# Patient Record
Sex: MALE | Race: WHITE | Employment: FULL TIME | URBAN - METROPOLITAN AREA
[De-identification: names, ages, dates, MRNs, and addresses within clinical notes are randomized per-mention and may not be internally consistent; named-entity substitution may affect disease eponyms.]

---

## 2019-07-01 ENCOUNTER — OFFICE VISIT (OUTPATIENT)
Dept: URGENT CARE | Facility: URGENT CARE | Age: 34
End: 2019-07-01
Payer: COMMERCIAL

## 2019-07-01 VITALS
DIASTOLIC BLOOD PRESSURE: 78 MMHG | OXYGEN SATURATION: 98 % | WEIGHT: 186 LBS | SYSTOLIC BLOOD PRESSURE: 131 MMHG | TEMPERATURE: 96.5 F | HEART RATE: 60 BPM

## 2019-07-01 DIAGNOSIS — N50.812 LEFT TESTICULAR PAIN: Primary | ICD-10-CM

## 2019-07-01 LAB
ALBUMIN UR-MCNC: NEGATIVE MG/DL
APPEARANCE UR: CLEAR
BILIRUB UR QL STRIP: NEGATIVE
COLOR UR AUTO: YELLOW
GLUCOSE UR STRIP-MCNC: NEGATIVE MG/DL
HGB UR QL STRIP: NEGATIVE
KETONES UR STRIP-MCNC: NEGATIVE MG/DL
LEUKOCYTE ESTERASE UR QL STRIP: NEGATIVE
NITRATE UR QL: NEGATIVE
PH UR STRIP: 5.5 PH (ref 5–7)
SOURCE: NORMAL
SP GR UR STRIP: >1.03 (ref 1–1.03)
UROBILINOGEN UR STRIP-ACNC: 0.2 EU/DL (ref 0.2–1)

## 2019-07-01 PROCEDURE — 99203 OFFICE O/P NEW LOW 30 MIN: CPT | Performed by: PHYSICIAN ASSISTANT

## 2019-07-01 PROCEDURE — 87491 CHLMYD TRACH DNA AMP PROBE: CPT | Performed by: PHYSICIAN ASSISTANT

## 2019-07-01 PROCEDURE — 87591 N.GONORRHOEAE DNA AMP PROB: CPT | Performed by: PHYSICIAN ASSISTANT

## 2019-07-01 PROCEDURE — 81003 URINALYSIS AUTO W/O SCOPE: CPT | Performed by: PHYSICIAN ASSISTANT

## 2019-07-01 SDOH — HEALTH STABILITY: MENTAL HEALTH: HOW OFTEN DO YOU HAVE A DRINK CONTAINING ALCOHOL?: 2-4 TIMES A MONTH

## 2019-07-01 ASSESSMENT — ENCOUNTER SYMPTOMS
BLURRED VISION: 0
FREQUENCY: 0
HEADACHES: 0
NAUSEA: 0
PSYCHIATRIC NEGATIVE: 1
CONSTIPATION: 0
DIARRHEA: 0
FLANK PAIN: 1
ABDOMINAL PAIN: 0
DYSURIA: 0
BACK PAIN: 1
SHORTNESS OF BREATH: 0
HEMATURIA: 0
SORE THROAT: 0
DIZZINESS: 0
BRUISES/BLEEDS EASILY: 0
VOMITING: 0
FEVER: 0

## 2019-07-01 NOTE — PATIENT INSTRUCTIONS
Your urine showed no signs of a urinary tract infection (this can sometimes cause testicular pain).    Your gonorrhea and chlamydia testing will return in the next few days. We will call you only if results are positive, otherwise, no call means negative. You may also sign up for [employer] at [email or web address] to view your own results.    Please get your testicular ultrasound done in the next few days. Call to schedule your appointment.    If severe pain, testicular swelling or redness, fever, or any other new, concerning symptoms, go to the ER immediately.

## 2019-07-01 NOTE — PROGRESS NOTES
"SUBJECTIVE:   Sandor Gillis is a 34 year old male presenting for evaluation of   Chief Complaint   Patient presents with     Urgent Care     Pt in clinic to have eval for groin pain.     Groin Pain   .  This weekend he woke up with left testicular \"soreness.\" It went away a little bit but then flared again after he went lifting this morning. No redness or discoloration of testicle, he says.  He says \"it feels like he got hit in the nuts.\"  Says the pain level is \"really low and if it weren't his testicles he would just let it go.\"  He admits to mild left flank to abdominal pain. This comes and goes. Feels like \"the stomachache when you get hit in the groin.\" Notices it more when he exerts himself.   He travels a lot. Denies known exposure to STIs but consents to testing today.  No dysuria, hematuria, discharge from penis. No fever. No nausea, vomiting. Has had normal BMs. No hernia.  He has not tried any treatments.    Review of Systems   Constitutional: Negative for fever.   HENT: Negative for sore throat.    Eyes: Negative for blurred vision.   Respiratory: Negative for shortness of breath.    Cardiovascular: Negative for chest pain.   Gastrointestinal: Negative for abdominal pain, constipation, diarrhea, nausea and vomiting.   Genitourinary: Positive for flank pain. Negative for dysuria, frequency and hematuria.        Left testicular pain   Musculoskeletal: Positive for back pain.   Skin: Negative for rash.   Neurological: Negative for dizziness and headaches.   Endo/Heme/Allergies: Does not bruise/bleed easily.   Psychiatric/Behavioral: Negative.          PMH:  Past Medical History:   Diagnosis Date     NO ACTIVE PROBLEMS      There are no active problems to display for this patient.        Current medications:  No current outpatient medications on file.       Surgical History:  Past Surgical History:   Procedure Laterality Date     no prior surgeries         Family history:  Family History   Problem Relation " Age of Onset     Parkinsonism Mother      No Known Problems Father          Social History:  Social History     Tobacco Use     Smoking status: Never Smoker     Smokeless tobacco: Never Used   Substance Use Topics     Alcohol use: Yes     Frequency: 2-4 times a month           OBJECTIVE:  /78   Pulse 60   Temp 96.5  F (35.8  C) (Tympanic)   Wt 84.4 kg (186 lb)   SpO2 98%     Physical Exam  General: alert, appears well, NAD. Afebrile.  Skin: no suspicious lesions or rashes.  Respiratory: No distress. Equal inspiration to bilateral bases. No crackles wheeze, rhonchi, rales.   Cardiovascular: RRR. No murmurs, clicks, gallups, or rub.   Gastrointestinal: Abdomen soft, nontender, BS present. No masses, organomegaly.  : normal penis and testicles to inspection. No testicular erythema or swelling noted. Testicles nontender. No inguinal hernias detected bilaterally.  Musculoskeletal: extremities normal- no gross deformities noted, gait normal and normal tone.  Psychiatric: mentation appears normal and affect normal/bright           Labs:  Results for orders placed or performed in visit on 07/01/19 (from the past 24 hour(s))   *UA reflex to Microscopic and Culture ([place] and [facility] (except [place] and [place])   Result Value Ref Range    Color Urine Yellow     Appearance Urine Clear     Glucose Urine Negative NEG^Negative mg/dL    Bilirubin Urine Negative NEG^Negative    Ketones Urine Negative NEG^Negative mg/dL    Specific Gravity Urine >1.030 1.003 - 1.035    Blood Urine Negative NEG^Negative    pH Urine 5.5 5.0 - 7.0 pH    Protein Albumin Urine Negative NEG^Negative mg/dL    Urobilinogen Urine 0.2 0.2 - 1.0 EU/dL    Nitrite Urine Negative NEG^Negative    Leukocyte Esterase Urine Negative NEG^Negative    Source Midstream Urine        X-Ray was not done.      ASSESSMENT/PLAN:      ICD-10-CM    1. Left testicular pain N50.812 US Testicular & Scrotum w Doppler Ltd     *UA reflex to Microscopic and  Culture ([place] and [facility] (except [place] and [place])     Neisseria gonorrhoeae PCR     Chlamydia trachomatis PCR        Medical Decision Making:    Serious Comorbid Conditions: none     Subacute left testicular pain x several days, waxing and waning.   Differential Diagnosis:   Epididymitis- did not have focal tenderness over epididymis on exam today. UA completely clear today.  Gonorrhea and chlamydia testing pending.  Torsion- highly doubt as no severe pain and it has now been several days of waxing and waning pain.   Hydrocele, varicocele- testicular US ordered on non-urgent basis- recommend patient get this done this week.    This is not an acute scrotum on exam - does not need urgent/emergent testicular US, but did feel it was indicated given the ongoing soreness. Patient to schedule this week.    At the end of the encounter, I discussed all available results with patient. Discussed diagnosis and treatment plan.   Return precautions provided to patient and printed below in patient instructions.  Patient understood and agreed to plan. Patient was appropriate for discharge.        Patient Instructions   Your urine showed no signs of a urinary tract infection (this can sometimes cause testicular pain).    Your gonorrhea and chlamydia testing will return in the next few days. We will call you only if results are positive, otherwise, no call means negative. You may also sign up for [employer] at [email or web address] to view your own results.    Please get your testicular ultrasound done in the next few days. Call to schedule your appointment.    If severe pain, testicular swelling or redness, fever, or any other new, concerning symptoms, go to the ER immediately.            Mercedes Grimaldo PA-C  07/01/19 2:13 PM

## 2019-07-02 LAB
C TRACH DNA SPEC QL NAA+PROBE: NEGATIVE
N GONORRHOEA DNA SPEC QL NAA+PROBE: NEGATIVE
SPECIMEN SOURCE: NORMAL
SPECIMEN SOURCE: NORMAL

## 2020-04-18 ENCOUNTER — OFFICE VISIT (OUTPATIENT)
Dept: URGENT CARE | Facility: URGENT CARE | Age: 35
End: 2020-04-18
Payer: COMMERCIAL

## 2020-04-18 VITALS
DIASTOLIC BLOOD PRESSURE: 80 MMHG | RESPIRATION RATE: 12 BRPM | SYSTOLIC BLOOD PRESSURE: 122 MMHG | BODY MASS INDEX: 25.73 KG/M2 | WEIGHT: 190 LBS | HEART RATE: 80 BPM | HEIGHT: 72 IN | TEMPERATURE: 98.1 F

## 2020-04-18 DIAGNOSIS — L50.9 URTICARIA: Primary | ICD-10-CM

## 2020-04-18 PROCEDURE — 99214 OFFICE O/P EST MOD 30 MIN: CPT | Performed by: FAMILY MEDICINE

## 2020-04-18 RX ORDER — PREDNISONE 20 MG/1
40 TABLET ORAL DAILY
Qty: 10 TABLET | Refills: 0 | Status: SHIPPED | OUTPATIENT
Start: 2020-04-18

## 2020-04-18 RX ORDER — TRIAMCINOLONE ACETONIDE 1 MG/G
CREAM TOPICAL 2 TIMES DAILY
Qty: 60 G | Refills: 1 | Status: SHIPPED | OUTPATIENT
Start: 2020-04-18 | End: 2020-04-18

## 2020-04-18 RX ORDER — TRIAMCINOLONE ACETONIDE 1 MG/G
CREAM TOPICAL 2 TIMES DAILY
Qty: 60 G | Refills: 1 | Status: SHIPPED | OUTPATIENT
Start: 2020-04-18

## 2020-04-18 RX ORDER — PREDNISONE 20 MG/1
40 TABLET ORAL DAILY
Qty: 10 TABLET | Refills: 0 | Status: SHIPPED | OUTPATIENT
Start: 2020-04-18 | End: 2020-04-18

## 2020-04-18 ASSESSMENT — MIFFLIN-ST. JEOR: SCORE: 1844.83

## 2020-04-19 NOTE — PROGRESS NOTES
SUBJECTIVE:  Sandor Gillis is a 35 year old male who presents to the clinic today for a rash.    Rash present for the past 2 weeks, initially thought it was due to poison ivy, was out in woods and farming, has hobby farm.  Has tried OTC poison ivy remedies without improvement.  Rash was initially on right wrist, went to right hip, now on left arm, all over body and ankles.    No product use changes.  Travels so unsure if exposed to anything but would not know triggers.  Rash would get blistering and then dry up.  Endorsed excessive itchiness.    Past Medical History:   Diagnosis Date     NO ACTIVE PROBLEMS      No current outpatient medications on file.     Social History     Tobacco Use     Smoking status: Never Smoker     Smokeless tobacco: Never Used   Substance Use Topics     Alcohol use: Yes     Frequency: 2-4 times a month       ROS:  Review of systems negative except as stated above.    EXAM:   /80   Pulse 80   Temp 98.1  F (36.7  C) (Oral)   Resp 12   Ht 1.829 m (6')   Wt 86.2 kg (190 lb)   BMI 25.77 kg/m    GENERAL: alert, no acute distress.  SKIN: right wrist - healing excoriated and slightly scaling and thicken patch.  Left antecubital fossa with redden irregular patch, thicken, excoriated.  Right hip with irregular pink patch with slight scaling.  Chest and back with scattered redden raised macules.  Bilateral ankles with raised, redden raised patches   EYES: EOMI,  PERRL, conjunctiva clear  PSYCH: mentation appears normal and affect normal/bright    ASSESSMENT/PLAN:  (L50.9) Urticaria  (primary encounter diagnosis)  Plan: triamcinolone (KENALOG) 0.1 % external cream,         predniSONE (DELTASONE) 20 MG tablet,             Suspect possible contact dermatitis as initial insult but at this time has generalized rash.  RX prednisone burst given, RX triamcinolone cream given to apply to persistent rash.  Recommend to take benadryl, claritin/zyrtec/allegra.  Will have patient add either zantac or  pepcid for delayed histamine blocking.  Avoid triggers if able to identify    Follow up with primary provider in 1-2 weeks to review if needs further workup for allergy testing    Raymond Espino MD, MD  April 18, 2020 9:35 PM

## 2020-04-19 NOTE — PATIENT INSTRUCTIONS
Take full course of prednisone burst to treat generalized rash/urticaria  Okay to use triamcinolone cream to rash that is persistent  Take benadryl 50 mg every 6 hours as needed for itchiness  Take claritin, zyrtec, or allegra - 2 tablets daily for 2-4 weeks  Take zantac 150 mg twice a day for 1-2 weeks or pepcid 20 mg daily for 1-2 weeks    Consider allergy testing - please contact primary provider or allergist      Patient Education     Understanding Hives (Urticaria)    Urticaria (hives) are red, itchy, and swollen areas on the skin. They are most often an allergic reaction from eating a food or taking a medicine. Sometimes the cause may be unknown. A single hive can vary in size from a half inch to several inches. Hives can appear all over the body. Or they may appear on only one part of the body.  What causes hives?  Hives can be caused by food and beverages such as:    Tree nuts (almonds, walnuts, hazelnuts)    Peanuts    Eggs    Shellfish    Milk  Hives can also be caused by medicines such as:    Antibiotics, especially penicillin and sulfa-based medicines     Anticonvulsant or antiseizure medicines     Chemotherapy medicines   Other causes of hives include:    Infection or virus    Heat    Cold air or cold water    Exercise    Scratching or rubbing your skin, or wearing tight-fitting clothes that rub your skin    Being exposed to sunlight or light from a light bulb, in rare cases    Inhaled-chemicals in the environment from foods and drugs, insects, plants, or other sources  In some cases, hives may occur again and again with no specific cause.  If you have hives    Stay away from the food, drink, medicine, or other thing that may be causing the hives.    Ask your healthcare provider how to control itchy or irritated skin.    Talk with your healthcare provider right away if you think a medicine gave you hives.  Watch for anaphylaxis  If you have hives, watch for symptoms of a severe reaction that can affect  your entire body. This is called anaphylaxis. Symptoms can include swollen areas of the body, wheezing, trouble breathing or swallowing, and a hoarse voice. This reaction may happen right away. Or it may happen in an hour or more. In extreme cases, the airways from mouth to lungs may swell and make breathing difficult. This is a medical emergency. Use epinephrine medicine if you have it, and call 911 or go to the emergency room.     When to call your healthcare provider  Call your healthcare provider if:    Your hives feel uncomfortable    You have never had hives before    Your symptoms don't go away or come back    Your symptoms get worse or new symptoms develop such as:   ? Sneezing, coughing, runny or stuffy nose  ? Itching of the eyes, nose, or roof of the mouth  ? Itching, burning, stinging, or pain  ? Dry, flaky, cracking, or scaly skin  ? Red or purple spots  Call 911  Call 911 right away if you have:    Swelling in your lips, tongue, or throat, called angioedema    Drooling    Trouble breathing, talking, or swallowing    Cool, moist or pale (blue in color) skin    Fast and weak heartbeat    Wheezing or short of breath    Feeling lightheaded or confused    Diarrhea    Severe nausea or vomiting    Seizure    Feeling dizzy or weak, or a sudden drop in blood pressure  Date Last Reviewed: 4/1/2017 2000-2019 [employer]. [address] [place] [address]. All rights reserved. This information is not intended as a substitute for professional medical care. Always follow your healthcare professional's instructions.           Patient Education     Contact Dermatitis  Contact dermatitis is a skin rash caused by something that touches the skin and makes it irritated and inflamed. Your skin may be red, swollen, dry, and may be cracked. Blisters may form and ooze. The rash will itch.  Contact dermatitis can form on the face and neck, backs of hands, forearms, genitals, and lower legs.  People can  "get contact dermatitis from lots of sources. These include:    Plants such as poison ivy, oak, or sumac    Chemicals in hair dyes and rinses, soaps, solvents, waxes, fingernail polish, and deodorants     Jewelry or watchbands made of nickel  Contact dermatitis is not passed from person to person.  Talk with your healthcare provider about what may have caused the rash. A type of allergy testing called \"patch testing\" may be used to discover what you are allergic to. You will need to avoid the source of your rash in the future to prevent it from coming back.  Treatment is done to relieve itching and prevent the rash from coming back. The rash should go away in a few days to a few weeks.  Home care  Your healthcare provider may prescribe medicine to relieve swelling and itching. Follow all instructions when using these medicines.  General care:    Avoid anything that heats up your skin, such as hot showers or baths, or direct sunlight. This can make itching worse.    Apply cold compresses to soothe your sores to help relieve your symptoms. Do this for 30 minutes 3 to 4 times a day. You can make a cold compress by soaking a cloth in cold water. Squeeze out excess water. You can add colloidal oatmeal to the water to help reduce itching. For severe itching in a small area, apply an ice pack wrapped in a thin towel. Do this for 20 minutes 3 to 4 times a day.    You can also try wet dressings. One way to do this is to wear a wet piece of clothing under a dry one. Wear a damp shirt under a dry shirt if your upper body is affected. This can relieve itching and prevent you from scratching the affected area.    You can also help relieve large areas of itching by taking a lukewarm bath with colloidal oatmeal added to the water.    Use hydrocortisone cream for redness and irritation, unless another medicine was prescribed. You can also use benzocaine anesthetic cream or spray. Calamine lotion can also relieve mild " symptoms.    Use oral diphenhydramine to help reduce itching. You can buy this antihistamine at drug and grocery stores. It can make you sleepy, so use lower doses during the daytime. Or you can use loratadine. This is an antihistamine that will not make you sleepy. Do not use diphenhydramine if you have glaucoma or have trouble urinating due to an enlarged prostate.    If a plant causes your rash, make sure to wash your skin and the clothes you were wearing when you came into contact with the plant. This is to wash away the plant oils that gave you the rash and prevent more or worse symptoms.    Stay away from the substance or object that causes your symptoms. If you can t avoid it, wear gloves or some other type of protection.  Follow-up care  Follow up with your healthcare provider, or as advised.  When to seek medical advice  Call your healthcare provider right away if any of these occur:    Spreading of the rash to other parts of your body    Severe swelling of your face, eyelids, mouth, throat or tongue    Trouble urinating due to swelling in the genital area    Fever of 100.4 F (38 C) or higher    Redness or swelling that gets worse    Pain that gets worse    Foul-smelling fluid leaking from the skin    Yellow-brown crusts on the open blisters  Date Last Reviewed: 9/1/2016 2000-2019 [employer]. [address] [place] [address], [place] [address]. All rights reserved. This information is not intended as a substitute for professional medical care. Always follow your healthcare professional's instructions.

## 2021-03-25 ENCOUNTER — VIRTUAL VISIT (OUTPATIENT)
Dept: FAMILY MEDICINE | Facility: CLINIC | Age: 36
End: 2021-03-25
Payer: COMMERCIAL

## 2021-03-25 DIAGNOSIS — R21 RASH: Primary | ICD-10-CM

## 2021-03-25 PROCEDURE — 99213 OFFICE O/P EST LOW 20 MIN: CPT | Mod: 95 | Performed by: FAMILY MEDICINE

## 2021-03-25 RX ORDER — PREDNISONE 20 MG/1
40 TABLET ORAL DAILY
Qty: 10 TABLET | Refills: 0 | Status: SHIPPED | OUTPATIENT
Start: 2021-03-25 | End: 2021-03-30

## 2021-03-25 NOTE — PROGRESS NOTES
Sandor is a 36 year old who is being evaluated via a billable telephone visit.      What phone number would you like to be contacted at?   How would you like to obtain your AVS? MyChart    Assessment & Plan     Rash  Medication faxed, recommend adding an antihistamine to the prednisone.  - predniSONE (DELTASONE) 20 MG tablet; Take 2 tablets (40 mg) by mouth daily for 5 days    [id number]}      FUTURE APPOINTMENTS:       - Follow-up visit in one month or sooner as needed.    Return in about 1 week (around 4/1/2021), or if symptoms worsen or fail to improve, for Follow up.    Meli Dennis MD  [facility]    Subjective   Sandor is a 36 year old who presents for the following health issues     HPI   Patient is a 36-year-old male presenting with a rash started this morning rash is localized on his eyelids and across his face.  Reports that he had a similar rash about this time last year he was diagnosed with poison ivy like rash.  He is experiencing intense itching.  He did mention that he has been doing a lot of a lot of outdoor activities.  He has no drainage from his eyes, no redness.Rash is described as red, raised and itchy.     Denies any other systemic symptoms.    Rash- had the same rash one year ago around this time and was treated for poison ivy or some sort contact with the dirt. He was RX'd Prednisone. 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  Onset/Duration: last night  Description  Location: face  Character: raised, red, otching  Itching: severe  Intensity:  severe  Progression of Symptoms:  worsening  Accompanying signs and symptoms:   Fever: no  Body aches or joint pain: no  Sore throat symptoms: no  Recent cold symptoms: no  History:           Previous episodes of similar rash: yes one year ago around this time last year  New exposures:  None  Recent travel: no  Exposure to similar rash: no  Precipitating or alleviating factors: Zyrtec  Therapies tried  and outcome: none        Review of Systems   Constitutional, HEENT, cardiovascular, pulmonary, gi and gu systems are negative, except as otherwise noted.      Objective           Vitals:  No vitals were obtained today due to virtual visit.    Physical Exam   healthy, alert and no distress  PSYCH: Alert and oriented times 3; coherent speech, normal   rate and volume, able to articulate logical thoughts, able   to abstract reason, no tangential thoughts, no hallucinations   or delusions  His affect is normal  RESP: No cough, no audible wheezing, able to talk in full sentences  Remainder of exam unable to be completed due to telephone visits                Phone call duration: 4 minutes

## 2021-03-31 ENCOUNTER — NURSE TRIAGE (OUTPATIENT)
Dept: NURSING | Facility: CLINIC | Age: 36
End: 2021-03-31

## 2021-03-31 ENCOUNTER — OFFICE VISIT (OUTPATIENT)
Dept: URGENT CARE | Facility: URGENT CARE | Age: 36
End: 2021-03-31
Payer: COMMERCIAL

## 2021-03-31 VITALS
OXYGEN SATURATION: 97 % | HEART RATE: 73 BPM | WEIGHT: 190 LBS | SYSTOLIC BLOOD PRESSURE: 124 MMHG | BODY MASS INDEX: 25.77 KG/M2 | TEMPERATURE: 97.4 F | DIASTOLIC BLOOD PRESSURE: 77 MMHG

## 2021-03-31 DIAGNOSIS — L30.9 DERMATITIS: Primary | ICD-10-CM

## 2021-03-31 PROCEDURE — 99213 OFFICE O/P EST LOW 20 MIN: CPT | Performed by: FAMILY MEDICINE

## 2021-03-31 RX ORDER — HYDROXYZINE HYDROCHLORIDE 25 MG/1
25-50 TABLET, FILM COATED ORAL 3 TIMES DAILY PRN
Qty: 20 TABLET | Refills: 0 | Status: SHIPPED | OUTPATIENT
Start: 2021-03-31

## 2021-03-31 RX ORDER — PREDNISONE 20 MG/1
TABLET ORAL
Qty: 15 TABLET | Refills: 0 | Status: SHIPPED | OUTPATIENT
Start: 2021-03-31

## 2021-03-31 NOTE — PATIENT INSTRUCTIONS
Prednisone 40mg a day for 5 days then 20mg a day for the last 5 days      Atarax/vistaril 25-50mg (caution for drowsiness) -- used every 8 hours as needed for itching        In the future when you have poison ivy rashes ask for a 10-15 day prednisone taper (5 days is too short and can cause rebound dermatitis)

## 2021-03-31 NOTE — PROGRESS NOTES
Assessment & Plan     Dermatitis  Experiencing rebound dermatitis from an abrupt /short course of prednisone after exposure to poison ivy. Will prescribe him 10 days course at 40mg x 5 then 20mg x 5 days. Atarax provided for itching.   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     The rash initially was present on left upper face but now appearing to be on his left torso area and left arm  There are no active problems to display for this patient.      Current Outpatient Medications   Medication     hydrOXYzine (ATARAX) 25 MG tablet     predniSONE (DELTASONE) 20 MG tablet     predniSONE (DELTASONE) 20 MG tablet     triamcinolone (KENALOG) 0.1 % external cream     No current facility-administered medications for this visit.            Objective    /77   Pulse 73   Temp 97.4  F (36.3  C) (Tympanic)   Wt 86.2 kg (190 lb)   SpO2 97%   BMI 25.77 kg/m    Physical Exam     Skin: elevated red rash with no active drainage seen in patches along the arm and left lower torso anterior     No results found for any visits on 03/31/21.                  The use of Dragon/[employer] dictation services may have been used to construct the content in this note; any grammatical or spelling errors are non-intentional. Please contact the author of this note directly if you are in need of any clarification.

## 2021-03-31 NOTE — TELEPHONE ENCOUNTER
Had poison ivy on face,   Started on prednisone for 5 days.  Stopped prednisone.48 hours ago.  Today Poison ivy started up again.    Now it is on bilateral , arms  hands , arm, groin.and hips right now.    Patient advised to be seen soon, as he has a history of it going bodywide.    Vandana Freitas RN on 3/31/2021 at 2:03 PM    Additional Information    SEVERE itching interferes with normal activities (e.g., work or school) or prevents sleep    Rash involves more than one fourth of the body    Face, eyes, lips or genitals are involved    Severe poison ivy reaction in the past    Patient wants to be seen    Protocols used: POISON IVY - [facility] - [facility]

## 2023-06-02 ENCOUNTER — HEALTH MAINTENANCE LETTER (OUTPATIENT)
Age: 38
End: 2023-06-02

## 2024-04-19 ENCOUNTER — LAB REQUISITION (OUTPATIENT)
Dept: LAB | Facility: CLINIC | Age: 39
End: 2024-04-19
Payer: COMMERCIAL

## 2024-04-19 DIAGNOSIS — Z13.1 ENCOUNTER FOR SCREENING FOR DIABETES MELLITUS: ICD-10-CM

## 2024-04-19 DIAGNOSIS — Z13.6 ENCOUNTER FOR SCREENING FOR CARDIOVASCULAR DISORDERS: ICD-10-CM

## 2024-04-19 PROCEDURE — 80048 BASIC METABOLIC PNL TOTAL CA: CPT | Mod: ORL | Performed by: FAMILY MEDICINE

## 2024-04-19 PROCEDURE — 80061 LIPID PANEL: CPT | Mod: ORL | Performed by: FAMILY MEDICINE

## 2024-04-20 LAB
ANION GAP SERPL CALCULATED.3IONS-SCNC: 12 MMOL/L (ref 7–15)
BUN SERPL-MCNC: 17.8 MG/DL (ref 6–20)
CALCIUM SERPL-MCNC: 9.7 MG/DL (ref 8.6–10)
CHLORIDE SERPL-SCNC: 102 MMOL/L (ref 98–107)
CHOLEST SERPL-MCNC: 178 MG/DL
CREAT SERPL-MCNC: 1.29 MG/DL (ref 0.67–1.17)
DEPRECATED HCO3 PLAS-SCNC: 24 MMOL/L (ref 22–29)
EGFRCR SERPLBLD CKD-EPI 2021: 73 ML/MIN/1.73M2
FASTING STATUS PATIENT QL REPORTED: ABNORMAL
GLUCOSE SERPL-MCNC: 90 MG/DL (ref 70–99)
HDLC SERPL-MCNC: 45 MG/DL
LDLC SERPL CALC-MCNC: 90 MG/DL
NONHDLC SERPL-MCNC: 133 MG/DL
POTASSIUM SERPL-SCNC: 4 MMOL/L (ref 3.4–5.3)
SODIUM SERPL-SCNC: 138 MMOL/L (ref 135–145)
TRIGL SERPL-MCNC: 215 MG/DL